# Patient Record
Sex: FEMALE | Race: WHITE
[De-identification: names, ages, dates, MRNs, and addresses within clinical notes are randomized per-mention and may not be internally consistent; named-entity substitution may affect disease eponyms.]

---

## 2023-02-14 PROBLEM — Z00.00 ENCOUNTER FOR PREVENTIVE HEALTH EXAMINATION: Status: ACTIVE | Noted: 2023-02-14

## 2023-02-23 ENCOUNTER — APPOINTMENT (OUTPATIENT)
Dept: COLORECTAL SURGERY | Facility: CLINIC | Age: 58
End: 2023-02-23
Payer: SELF-PAY

## 2023-02-23 VITALS
SYSTOLIC BLOOD PRESSURE: 109 MMHG | WEIGHT: 112 LBS | DIASTOLIC BLOOD PRESSURE: 78 MMHG | TEMPERATURE: 98.2 F | HEART RATE: 103 BPM | HEIGHT: 68.5 IN | BODY MASS INDEX: 16.78 KG/M2

## 2023-02-23 DIAGNOSIS — K62.5 HEMORRHAGE OF ANUS AND RECTUM: ICD-10-CM

## 2023-02-23 DIAGNOSIS — K64.8 OTHER HEMORRHOIDS: ICD-10-CM

## 2023-02-23 PROCEDURE — 46600 DIAGNOSTIC ANOSCOPY SPX: CPT

## 2023-02-23 PROCEDURE — 99203 OFFICE O/P NEW LOW 30 MIN: CPT | Mod: 25

## 2023-02-23 RX ORDER — HYDROCORTISONE 25 MG/G
2.5 CREAM TOPICAL
Qty: 1 | Refills: 2 | Status: ACTIVE | COMMUNITY
Start: 2023-02-23 | End: 1900-01-01

## 2023-02-23 NOTE — ASSESSMENT
[FreeTextEntry1] : Exam findings and diagnosis were discussed at length with patient. \par Recommendations including increased fiber intake, adequate daily hydration were discussed.\par Avoid constipation and diarrhea, avoid pushing/straining.\par Symptoms could be due to the hemorrhoids seen on anoscopy. \par Medical management, such as hydrocortisone cream, was discussed. Rx provided.\par If symptoms persist or worsen despite treatment of hemorrhoids, we discussed further evaluation of colon and rectum with colonoscopy to rule out a more proximal source. \par All questions answered, patient expressed understanding and is agreeable to this plan.\par

## 2023-02-23 NOTE — PHYSICAL EXAM
[FreeTextEntry1] : Medical assistant present for duration of physical examination\par  also present at request of patient. \par \par General no acute distress, alert and oriented\par Psych calm, pleasant demeanor, responding appropriately to questions\par Nonlabored breathing\par Ambulating without assistance\par Skin normal color and pigment, no visible lesions or rashes\par \par Anorectal Exam:\par Inspection no erythema, induration or fluctuance, no skin excoriation, no fissure, soft small external hemorrhoids without inflammation or thrombosis\par DANICA nontender, no masses palpated, no blood on gloved finger\par \par Procedure: Anoscopy\par \par Pre procedure Diagnosis: BRBPR, hemorrhoids\par Post procedure Diagnosis: BRBPR, hemorrhoids\par Anesthesia: none\par Estimated blood loss: none\par Specimen: none\par Complications: none\par \par Consent obtained. Anoscopy was performed by passing a lighted anoscope with lubricant jelly into the anal canal and the entire anal mucosal surface was inspected. Findings included no fissure, mild internal hemorrhoids with inflammation, no active bleeding, no visible masses or lesions in anal canal\par \par Patient tolerated examination and procedure well.\par \par \par

## 2023-02-23 NOTE — HISTORY OF PRESENT ILLNESS
[FreeTextEntry1] : 58 y/o F presents for initial evaluation of "hemorrhoids/fissure".\par \par PMH: asthma\par PSH: Uterine fibroid removal\par Denies FMH Colorectal CA or IBD?: \par Last Colonoscopy 2015, normal colonoscopy per patient, due for repeat in 10 years. EGD two weeks ago in [place] d/t acid reflux. \par \par Noticed rectal bleeding 1 week ago, on TP with bowel movement, anal itchiness. GI in [place] told her she has hemorrhoids and fissures. She lives in the [place] for 4 months out of the year, spends the rest of the year in [place]. She was not prescribed any medications nor does she use anything over the counter. Denies previous surgical treatment. She reports feeling extra tissue that does not retract. Denies stool leakage. \par \par BH: once a day, hard and dry. Progesterone makes her constipated.\par Denies use of stool softeners or fiber supplements. Laxative sometimes. \par Admits ASA/NSAIDs in last 7 days, sore throat \par \par \par \par